# Patient Record
Sex: FEMALE | URBAN - METROPOLITAN AREA
[De-identification: names, ages, dates, MRNs, and addresses within clinical notes are randomized per-mention and may not be internally consistent; named-entity substitution may affect disease eponyms.]

---

## 2017-01-04 VITALS
WEIGHT: 144.62 LBS | HEART RATE: 63 BPM | SYSTOLIC BLOOD PRESSURE: 101 MMHG | TEMPERATURE: 99 F | RESPIRATION RATE: 18 BRPM | HEIGHT: 69 IN | OXYGEN SATURATION: 100 % | DIASTOLIC BLOOD PRESSURE: 54 MMHG

## 2017-01-04 NOTE — ASU PATIENT PROFILE, ADULT - PSH
H/O bilateral mastectomy H/O abdominal hysterectomy    H/O bilateral mastectomy    History of hip surgery  LEFT FRACTURED HIP

## 2017-01-04 NOTE — ASU PATIENT PROFILE, ADULT - VISION (WITH CORRECTIVE LENSES IF THE PATIENT USUALLY WEARS THEM):
Partially impaired: cannot see medication labels or newsprint, but can see obstacles in path, and the surrounding layout; can count fingers at arm's length/glasses

## 2017-01-04 NOTE — ASU PATIENT PROFILE, ADULT - PMH
Breast cancer    Hypothyroidism    Malignant neoplasm  of pancreas  Ovarian cyst Breast cancer  LEFT  Hypothyroidism    Malignant neoplasm  of pancreas  Ovarian cyst

## 2017-01-04 NOTE — ASU PREOP CHECKLIST - INTERNAL PROSTHESES
breast implants both  bilateral  , left hip metal/yes(specify) breast implants  bilateral  , left hip metal/yes(specify)

## 2017-01-05 ENCOUNTER — OUTPATIENT (OUTPATIENT)
Dept: OUTPATIENT SERVICES | Facility: HOSPITAL | Age: 57
LOS: 1 days | Discharge: HOME | End: 2017-01-05
Payer: COMMERCIAL

## 2017-01-05 ENCOUNTER — RESULT REVIEW (OUTPATIENT)
Age: 57
End: 2017-01-05

## 2017-01-05 VITALS
HEART RATE: 69 BPM | OXYGEN SATURATION: 100 % | DIASTOLIC BLOOD PRESSURE: 58 MMHG | RESPIRATION RATE: 16 BRPM | SYSTOLIC BLOOD PRESSURE: 109 MMHG

## 2017-01-05 DIAGNOSIS — Z92.21 PERSONAL HISTORY OF ANTINEOPLASTIC CHEMOTHERAPY: ICD-10-CM

## 2017-01-05 DIAGNOSIS — C50.912 MALIGNANT NEOPLASM OF UNSPECIFIED SITE OF LEFT FEMALE BREAST: ICD-10-CM

## 2017-01-05 DIAGNOSIS — E03.9 HYPOTHYROIDISM, UNSPECIFIED: ICD-10-CM

## 2017-01-05 DIAGNOSIS — K21.9 GASTRO-ESOPHAGEAL REFLUX DISEASE WITHOUT ESOPHAGITIS: ICD-10-CM

## 2017-01-05 DIAGNOSIS — C77.3 SECONDARY AND UNSPECIFIED MALIGNANT NEOPLASM OF AXILLA AND UPPER LIMB LYMPH NODES: ICD-10-CM

## 2017-01-05 DIAGNOSIS — M19.90 UNSPECIFIED OSTEOARTHRITIS, UNSPECIFIED SITE: ICD-10-CM

## 2017-01-05 DIAGNOSIS — Z90.710 ACQUIRED ABSENCE OF BOTH CERVIX AND UTERUS: Chronic | ICD-10-CM

## 2017-01-05 DIAGNOSIS — Z90.13 ACQUIRED ABSENCE OF BILATERAL BREASTS AND NIPPLES: Chronic | ICD-10-CM

## 2017-01-05 DIAGNOSIS — Z98.890 OTHER SPECIFIED POSTPROCEDURAL STATES: Chronic | ICD-10-CM

## 2017-01-05 DIAGNOSIS — F41.9 ANXIETY DISORDER, UNSPECIFIED: ICD-10-CM

## 2017-01-05 DIAGNOSIS — C78.89 SECONDARY MALIGNANT NEOPLASM OF OTHER DIGESTIVE ORGANS: ICD-10-CM

## 2017-01-05 DIAGNOSIS — Z17.1 ESTROGEN RECEPTOR NEGATIVE STATUS [ER-]: ICD-10-CM

## 2017-01-05 PROCEDURE — 38500 BIOPSY/REMOVAL LYMPH NODES: CPT

## 2017-01-05 PROCEDURE — 88305 TISSUE EXAM BY PATHOLOGIST: CPT

## 2017-01-05 PROCEDURE — 19120 REMOVAL OF BREAST LESION: CPT | Mod: LT

## 2017-01-05 PROCEDURE — 88360 TUMOR IMMUNOHISTOCHEM/MANUAL: CPT

## 2017-01-05 PROCEDURE — 19301 PARTIAL MASTECTOMY: CPT | Mod: LT,GC

## 2017-01-05 PROCEDURE — 88307 TISSUE EXAM BY PATHOLOGIST: CPT

## 2017-01-05 PROCEDURE — 88341 IMHCHEM/IMCYTCHM EA ADD ANTB: CPT

## 2017-01-05 PROCEDURE — 38525 BIOPSY/REMOVAL LYMPH NODES: CPT | Mod: LT,GC

## 2017-01-05 RX ORDER — SODIUM CHLORIDE 9 MG/ML
1000 INJECTION, SOLUTION INTRAVENOUS
Qty: 0 | Refills: 0 | Status: DISCONTINUED | OUTPATIENT
Start: 2017-01-05 | End: 2017-01-05

## 2017-01-05 RX ORDER — CEPHALEXIN 500 MG
1 CAPSULE ORAL
Qty: 28 | Refills: 0 | OUTPATIENT
Start: 2017-01-05 | End: 2017-01-12

## 2017-01-05 RX ORDER — ACETAMINOPHEN 500 MG
650 TABLET ORAL EVERY 4 HOURS
Qty: 0 | Refills: 0 | Status: DISCONTINUED | OUTPATIENT
Start: 2017-01-05 | End: 2017-01-05

## 2017-01-05 RX ORDER — HYDROMORPHONE HYDROCHLORIDE 2 MG/ML
0.5 INJECTION INTRAMUSCULAR; INTRAVENOUS; SUBCUTANEOUS
Qty: 0 | Refills: 0 | Status: DISCONTINUED | OUTPATIENT
Start: 2017-01-05 | End: 2017-01-05

## 2017-01-05 RX ORDER — ONDANSETRON 8 MG/1
4 TABLET, FILM COATED ORAL EVERY 6 HOURS
Qty: 0 | Refills: 0 | Status: DISCONTINUED | OUTPATIENT
Start: 2017-01-05 | End: 2017-01-05

## 2017-01-05 RX ORDER — HYDROMORPHONE HYDROCHLORIDE 2 MG/ML
1 INJECTION INTRAMUSCULAR; INTRAVENOUS; SUBCUTANEOUS
Qty: 10 | Refills: 0 | OUTPATIENT
Start: 2017-01-05

## 2017-01-05 RX ADMIN — HYDROMORPHONE HYDROCHLORIDE 0.5 MILLIGRAM(S): 2 INJECTION INTRAMUSCULAR; INTRAVENOUS; SUBCUTANEOUS at 17:06

## 2017-01-05 RX ADMIN — HYDROMORPHONE HYDROCHLORIDE 0.5 MILLIGRAM(S): 2 INJECTION INTRAMUSCULAR; INTRAVENOUS; SUBCUTANEOUS at 17:12

## 2017-01-11 ENCOUNTER — APPOINTMENT (OUTPATIENT)
Dept: BREAST CENTER | Facility: CLINIC | Age: 57
End: 2017-01-11

## 2017-01-11 LAB — SURGICAL PATHOLOGY STUDY: SIGNIFICANT CHANGE UP

## 2017-01-17 ENCOUNTER — OTHER (OUTPATIENT)
Age: 57
End: 2017-01-17
